# Patient Record
Sex: FEMALE | Race: WHITE | NOT HISPANIC OR LATINO
[De-identification: names, ages, dates, MRNs, and addresses within clinical notes are randomized per-mention and may not be internally consistent; named-entity substitution may affect disease eponyms.]

---

## 2020-12-11 PROBLEM — Z00.00 ENCOUNTER FOR PREVENTIVE HEALTH EXAMINATION: Status: ACTIVE | Noted: 2020-12-11

## 2020-12-23 PROBLEM — Z87.39 HISTORY OF SCOLIOSIS: Status: RESOLVED | Noted: 2020-12-23 | Resolved: 2020-12-23

## 2020-12-23 PROBLEM — Z86.79 HISTORY OF HYPERTENSION: Status: RESOLVED | Noted: 2020-12-23 | Resolved: 2020-12-23

## 2020-12-23 PROBLEM — Z87.39 HISTORY OF SPINAL STENOSIS: Status: RESOLVED | Noted: 2020-12-23 | Resolved: 2020-12-23

## 2020-12-23 PROBLEM — Z86.39 HISTORY OF HYPERCHOLESTEROLEMIA: Status: RESOLVED | Noted: 2020-12-23 | Resolved: 2020-12-23

## 2020-12-23 PROBLEM — Z87.39 HISTORY OF OSTEOPOROSIS: Status: RESOLVED | Noted: 2020-12-23 | Resolved: 2020-12-23

## 2020-12-23 PROBLEM — Z87.2 HISTORY OF ATOPIC DERMATITIS: Status: RESOLVED | Noted: 2020-12-23 | Resolved: 2020-12-23

## 2020-12-23 RX ORDER — LOSARTAN POTASSIUM 100 MG/1
TABLET, FILM COATED ORAL
Refills: 0 | Status: ACTIVE | COMMUNITY

## 2020-12-23 RX ORDER — ATORVASTATIN CALCIUM 80 MG/1
TABLET, FILM COATED ORAL
Refills: 0 | Status: ACTIVE | COMMUNITY

## 2020-12-23 RX ORDER — AMLODIPINE BESYLATE 5 MG/1
TABLET ORAL
Refills: 0 | Status: ACTIVE | COMMUNITY

## 2020-12-23 RX ORDER — METOPROLOL SUCCINATE 200 MG/1
TABLET, EXTENDED RELEASE ORAL
Refills: 0 | Status: ACTIVE | COMMUNITY

## 2020-12-23 RX ORDER — DUPILUMAB 200 MG/1.14ML
INJECTION, SOLUTION SUBCUTANEOUS
Refills: 0 | Status: ACTIVE | COMMUNITY

## 2020-12-28 ENCOUNTER — APPOINTMENT (OUTPATIENT)
Dept: RADIATION ONCOLOGY | Facility: CLINIC | Age: 80
End: 2020-12-28
Payer: MEDICARE

## 2020-12-28 DIAGNOSIS — Z87.39 PERSONAL HISTORY OF OTHER DISEASES OF THE MUSCULOSKELETAL SYSTEM AND CONNECTIVE TISSUE: ICD-10-CM

## 2020-12-28 DIAGNOSIS — Z86.39 PERSONAL HISTORY OF OTHER ENDOCRINE, NUTRITIONAL AND METABOLIC DISEASE: ICD-10-CM

## 2020-12-28 DIAGNOSIS — Z87.2 PERSONAL HISTORY OF DISEASES OF THE SKIN AND SUBCUTANEOUS TISSUE: ICD-10-CM

## 2020-12-28 DIAGNOSIS — Z86.79 PERSONAL HISTORY OF OTHER DISEASES OF THE CIRCULATORY SYSTEM: ICD-10-CM

## 2020-12-28 PROCEDURE — 99204 OFFICE O/P NEW MOD 45 MIN: CPT | Mod: 95

## 2020-12-29 NOTE — DISEASE MANAGEMENT
[Pathological] : TNM Stage: p [N/A] : Currently not applicable [FreeTextEntry4] : Bilateral early stage breast cancer [TTNM] : X [NTNM] : X [MTNM] : X

## 2020-12-29 NOTE — HISTORY OF PRESENT ILLNESS
[Home] : at home, [unfilled] , at the time of the visit. [Medical Office: ([facility])___] : at the medical office located in  [Spouse] : spouse [FreeTextEntry1] : Mrs. Patricia is a 80 year old female referred for breast cancer by Dr. Nath. She presents with newly diagnosed Stage I [id number] ER+/ NH- invasive ductal carcinoma of the right breast, and Stage I [id number] ER/NH+ invasive ductal carcinoma of the left breast.\par \par On 9/28/2020 Mrs. Patricia underwent a screening mammogram at [facility] which identified an irregular density upper outer right breast. Further evaluation was recommended.\par \par On 10/1/2020 she had an ultrasound at [facility] which revealed  suspicious hypoechoic shadowing structure measuring 5 mm in the right breast and a biopsy was recommended. \par \par On 10/23/2020 Mrs. Patricia underwent a right breast  ultrasound guided biopsy at [facility] which revealed invasive ductal carcinoma, grade 6/9, ER+, moderate to strong staining greater than 90%, NH- and HER2 was 2+ by immunohistochemistry and non amplified by FISH. Slides were confirmed at [facility]. The patient then met with Dr. Nath who ordered a breast MRI.\par \par On 11/2/2020 she had an MRI of bilateral breasts at [facility] which demonstrated the biopsy proven irregular enhancing mass in the right breast and a 5 mm indeterminate nodular focus of enhancement in the contralateral in the left breast.\par \par On 11/6/2020 Mrs. Cisneros underwent  a left breast MRI guided biopsy in the 5:00 area at [facility]. The pathology revealed  invasive ductal carcinoma with mucinous features, grade 4/9, measuring 2 mm in greatest dimension on a single core and associated with DCIS, ER+, NH+, HER2-0. The slides were reviewed at [facility] confirming the diagnosis of low grade invasive duct carcinoma, HER2 -. \par \par On 11/25/2020 she underwent a bilateral lumpectomy with bilateral sentinel node biopsy done by Dr. Nath. The pathology revealed that the right breast contained a 1.2 cm invasive duct carcinoma, grade 6/9, unifocal, no DCIS was present. One sentinel node was negative, repeat NH was negative, and repeat HER2 was negative, 1+ by IHC  staging T1cN0 Stage IA. The right sided tumor was sent for Oncotype, which was 18, with the distant recurrence risk at 9 years of 5%.\par The pathology in the left breast contained no residual invasive disease, but did contain 5 mm of DCIS. Margins were negative, no LVI was demonstrated of the right sided tumor. The left breast had 2 negative sentinel nodes. T1aN0-Stage IA- ER/NH +, HER2 -.\par \par Mrs. Patricia was evaluated by  Dr. Barber on 12/18/2020. Dr. Barber ordered a bone density test, which she is going for on 12/31/2020. Dr. Barber referred her back to Dr. Valdes, her endocrinologist, to help manage her osteoporosis, along with the bone density test results, to establish a plan for post radiation. She will be seeing Dr. Valdes in February. She will see Dr. Barber after radiation is complete. She is also scheduled to see Dr. Nath in March for a follow up. \par \par Currently, Mrs. Patricia states she is healing well, but she stated she has bruising on the lower half of the right breast. She did complain of aching in bilateral breasts, but it is not a pain she is able to rate. She is not taking any medication for the discomfort. She does complain of insomnia and anxiety, both of which are not new to her. She denies any issues post surgery. She states she has full ROM in bilateral arms. She denies any edema in her bilateral upper extremities or her bilateral lower extremities. \par \par

## 2020-12-29 NOTE — LETTER CLOSING
[Consult Closing:] : Thank you for allowing me to participate in the care of this patient.  If you have any questions, please do not hesitate to contact me. [Sincerely yours,] : Sincerely yours, [FreeTextEntry3] : Terese Denise MD\par

## 2020-12-29 NOTE — REVIEW OF SYSTEMS
[Edema Limbs: Grade 0] : Edema Limbs: Grade 0  [Fatigue: Grade 1 - Fatigue relieved by rest] : Fatigue: Grade 1 - Fatigue relieved by rest [Breast Pain: Grade 1 - Mild pain] : Breast Pain: Grade 1 - Mild pain [Anxiety: Grade 1 - Mild symptoms; intervention not indicated] : Anxiety: Grade 1 - Mild symptoms; intervention not indicated [Depression: Grade 0] : Depression: Grade 0 [Insomnia: Grade 1 - Mild difficulty falling asleep, staying asleep or waking up early] : Insomnia: Grade 1 - Mild difficulty falling asleep, staying asleep or waking up early [FreeTextEntry7] : unratable discomfort

## 2020-12-29 NOTE — OB/GYN HISTORY
[Menopause Age: ____] : patient was [unfilled] years old at menopause [___] : Living: [unfilled] [Spotting Between  Menses] : no spotting between menses [History of Birth Control Pills] : Patient has no history of taking birth control pills [History of Hormone Replacement Therapy] : no history of hormone replacement therapy

## 2021-01-04 ENCOUNTER — APPOINTMENT (OUTPATIENT)
Dept: RADIATION ONCOLOGY | Facility: CLINIC | Age: 81
End: 2021-01-04
Payer: MEDICARE

## 2021-01-04 VITALS
SYSTOLIC BLOOD PRESSURE: 120 MMHG | WEIGHT: 122 LBS | TEMPERATURE: 98 F | OXYGEN SATURATION: 98 % | RESPIRATION RATE: 14 BRPM | BODY MASS INDEX: 22.45 KG/M2 | HEART RATE: 75 BPM | HEIGHT: 62 IN | DIASTOLIC BLOOD PRESSURE: 69 MMHG

## 2021-01-04 PROCEDURE — 99214 OFFICE O/P EST MOD 30 MIN: CPT | Mod: 25

## 2021-01-05 NOTE — HISTORY OF PRESENT ILLNESS
[FreeTextEntry1] : Mrs. Patricia is a 80 year old female referred for breast cancer by Dr. Naht. She presents with newly diagnosed Stage I [id number] ER+/ MT- invasive ductal carcinoma of the right breast, and Stage I [id number] ER/MT+ invasive ductal carcinoma of the left breast.\par \par On 11/25/2020 she underwent a bilateral lumpectomy with bilateral sentinel node biopsy done by Dr. Nath. The pathology revealed that the right breast contained a 1.2 cm invasive duct carcinoma, grade 6/9, unifocal, no DCIS was present. One sentinel node was negative, repeat MT was negative, and repeat HER2 was negative, 1+ by IHC staging T1cN0 Stage IA. The right sided tumor was sent for Oncotype, which was 18, with the distant recurrence risk at 9 years of 5%.\par The pathology in the left breast contained no residual invasive disease, but did contain 5 mm of DCIS. Margins were negative, no LVI was demonstrated of the right sided tumor. The left breast had 2 negative sentinel nodes. T1aN0-Stage IA- ER/MT +, HER2 -.\par \par It is my opinion that the patient may benefit from adjuvant radiation to the left and right breast in an effort to improve her local control and decrease her risk of recurrence.  She and her  present today for exam, consent and simulation.

## 2021-01-05 NOTE — PHYSICAL EXAM
[Normal] : no focal deficits [de-identified] : s/p bilateral partial mastectomy, both breast are well healed.

## 2021-01-18 ENCOUNTER — TRANSCRIPTION ENCOUNTER (OUTPATIENT)
Age: 81
End: 2021-01-18

## 2021-01-20 ENCOUNTER — NON-APPOINTMENT (OUTPATIENT)
Age: 81
End: 2021-01-20

## 2021-01-21 VITALS
HEIGHT: 62 IN | OXYGEN SATURATION: 98 % | HEART RATE: 78 BPM | SYSTOLIC BLOOD PRESSURE: 122 MMHG | TEMPERATURE: 98 F | DIASTOLIC BLOOD PRESSURE: 69 MMHG | RESPIRATION RATE: 16 BRPM

## 2021-01-21 NOTE — REVIEW OF SYSTEMS
[Fatigue: Grade 0] : Fatigue: Grade 0 [Breast Pain: Grade 0] : Breast Pain: Grade 0 [Pruritus: Grade 0] : Pruritus: Grade 0 [Skin Hyperpigmentation: Grade 0] : Skin Hyperpigmentation: Grade 0 [Dermatitis Radiation: Grade 0] : Dermatitis Radiation: Grade 0

## 2021-01-21 NOTE — DISEASE MANAGEMENT
[Pathological] : TNM Stage: p [FreeTextEntry4] : Bilateral early stage breast cancer [TTNM] : X [NTNM] : X [MTNM] : X [N/A] : Currently not applicable [de-identified] : completed 4/16 fractions to the Left breast at a dose of 1060 cGy and 4/16 fractions to the right breast to a dose of 1049.5 cGy

## 2021-01-21 NOTE — HISTORY OF PRESENT ILLNESS
[FreeTextEntry1] : Mrs. Patricia is a 80 year old female referred for breast cancer by Dr. Nath. She presents with newly diagnosed Stage I [id number] ER+/ PA- invasive ductal carcinoma of the right breast, and Stage I [id number] ER/PA+ invasive ductal carcinoma of the left breast.\par \par She presents today for OTV completed 4/16 fractions to the left breast to a dose of 1060 cGy and to the right breast to a dose of 1049.5 cGy. She is feeling well today and with no complaints.

## 2021-01-27 ENCOUNTER — NON-APPOINTMENT (OUTPATIENT)
Age: 81
End: 2021-01-27

## 2021-01-27 VITALS
OXYGEN SATURATION: 96 % | SYSTOLIC BLOOD PRESSURE: 120 MMHG | DIASTOLIC BLOOD PRESSURE: 76 MMHG | TEMPERATURE: 98 F | HEIGHT: 62 IN | HEART RATE: 74 BPM | RESPIRATION RATE: 16 BRPM

## 2021-01-28 NOTE — HISTORY OF PRESENT ILLNESS
[FreeTextEntry1] : Mrs. Patrciia is a 80 year old female referred for breast cancer by Dr. Nath. She presents with newly diagnosed Stage I [id number] ER+/ CT- invasive ductal carcinoma of the right breast, and Stage I [id number] ER/CT+ invasive ductal carcinoma of the left breast.\par \par She presents today for OTV completed 9/16 fractions to the left breast to a dose of  2385 cGy and to the right breast to a dose of 2361.4 cGy. She is feeling well today. \par  She is using calendula cream BID The skin is looking slightly pink this week. She is experiencing fatigue and insomnia.

## 2021-01-28 NOTE — DISEASE MANAGEMENT
[Pathological] : TNM Stage: p [N/A] : Currently not applicable [FreeTextEntry4] : Bilateral early stage breast cancer [TTNM] : X [NTNM] : X [MTNM] : X [de-identified] : completed 9/16 fractions to the Left breast at a dose of 2385 cGy and 9/16 fractions to the right breast to a dose of 2361.4 cGy

## 2021-01-28 NOTE — PHYSICAL EXAM
[Normal] : normal scars, no masses, no nipple discharge [de-identified] : mild bilateral erythema

## 2021-02-03 ENCOUNTER — NON-APPOINTMENT (OUTPATIENT)
Age: 81
End: 2021-02-03

## 2021-02-03 VITALS
OXYGEN SATURATION: 98 % | TEMPERATURE: 98 F | DIASTOLIC BLOOD PRESSURE: 78 MMHG | RESPIRATION RATE: 16 BRPM | HEART RATE: 76 BPM | HEIGHT: 62 IN | SYSTOLIC BLOOD PRESSURE: 124 MMHG

## 2021-02-03 NOTE — REVIEW OF SYSTEMS
[Fatigue: Grade 0] : Fatigue: Grade 0 [Breast Pain: Grade 0] : Breast Pain: Grade 0 [Pruritus: Grade 1 - Mild or localized; topical intervention indicated] : Pruritus: Grade 1 - Mild or localized; topical intervention indicated [Skin Hyperpigmentation: Grade 0] : Skin Hyperpigmentation: Grade 0 [Dermatitis Radiation: Grade 1 - Faint erythema or dry desquamation] : Dermatitis Radiation: Grade 1 - Faint erythema or dry desquamation

## 2021-02-05 NOTE — DISEASE MANAGEMENT
[Pathological] : TNM Stage: p [N/A] : Currently not applicable [FreeTextEntry4] : Bilateral early stage breast cancer [TTNM] : X [NTNM] : X [MTNM] : X [de-identified] : completed 12/16 fractions to the Left breast at a dose of 3180 cGy and 12/16 fractions to the right breast to a dose of 3148.5 cGy

## 2021-02-05 NOTE — PHYSICAL EXAM
[Normal] : well developed, well nourished, in no acute distress [de-identified] : mild to moderate erythema bilaterally, maculopapular rash along the sternum. Small area of moist desquamation in the right inframammary fold

## 2021-02-05 NOTE — HISTORY OF PRESENT ILLNESS
[FreeTextEntry1] : Mrs. Patricia is a 80 year old female referred for breast cancer by Dr. Nath. She presents with newly diagnosed Stage I [id number] ER+/ WA- invasive ductal carcinoma of the right breast, and Stage I [id number] ER/WA+ invasive ductal carcinoma of the left breast.\par \par She presents today for OTV completed 12/16 fractions to the left breast to a dose of  3180 cGy and to the right breast to a dose of 3148.5 cGy. She is feeling well today. \par  She is using calendula cream BID The skin is looking a bit more pink and with a rash on the left chest wall and a bit on the right. She states that it is very itchy. She used Eurcissa on the area. She did use some hydrocortisone cream with the calendula. I also recommended Benadryl PO at night to help her sleep.

## 2021-02-10 ENCOUNTER — NON-APPOINTMENT (OUTPATIENT)
Age: 81
End: 2021-02-10

## 2021-02-11 VITALS
OXYGEN SATURATION: 99 % | HEIGHT: 62 IN | DIASTOLIC BLOOD PRESSURE: 80 MMHG | SYSTOLIC BLOOD PRESSURE: 128 MMHG | TEMPERATURE: 98 F | RESPIRATION RATE: 16 BRPM | HEART RATE: 80 BPM

## 2021-02-11 NOTE — PHYSICAL EXAM
[Normal] : normal scars, no masses, no nipple discharge [de-identified] : moderate erythema bilaterally, maculopapular rash along the sternum

## 2021-02-11 NOTE — REVIEW OF SYSTEMS
[Fatigue: Grade 0] : Fatigue: Grade 0 [Breast Pain: Grade 0] : Breast Pain: Grade 0 [Pruritus: Grade 1 - Mild or localized; topical intervention indicated] : Pruritus: Grade 1 - Mild or localized; topical intervention indicated [Dermatitis Radiation: Grade 1 - Faint erythema or dry desquamation] : Dermatitis Radiation: Grade 1 - Faint erythema or dry desquamation [Skin Hyperpigmentation: Grade 1 - Hyperpigmentation covering <10% BSA; no psychosocial impact] : Skin Hyperpigmentation: Grade 1 - Hyperpigmentation covering <10% BSA; no psychosocial impact

## 2021-02-11 NOTE — HISTORY OF PRESENT ILLNESS
[FreeTextEntry1] : Mrs. Patricia is a 80 year old female referred for breast cancer by Dr. Nath. She presents with newly diagnosed Stage I [id number] ER+/ MD- invasive ductal carcinoma of the right breast, and Stage I [id number] ER/MD+ invasive ductal carcinoma of the left breast.\par \par She presents today for OTV completed 16/16 fractions to the left breast to a dose of 4240 cGy and to the right breast to a dose of 4198  cGy. 1/4 fractions boost She is feeling well today. \par  She is using calendula cream BID She has developed a rash on the chest wall and it is itchy. She is using hydrocortisone cream and calendula. I have added aquaphor the chest wall and under her breast.

## 2021-02-11 NOTE — DISEASE MANAGEMENT
[Pathological] : TNM Stage: p [N/A] : Currently not applicable [FreeTextEntry4] : Bilateral early stage breast cancer [TTNM] : X [NTNM] : X [MTNM] : X [de-identified] : completed 16/16 fractions to the Left breast at a dose of 4240 cGy and 16/16 fractions to the right breast to a dose of 4198 cGy and 1/4 boost

## 2021-03-03 ENCOUNTER — APPOINTMENT (OUTPATIENT)
Dept: RADIATION ONCOLOGY | Facility: CLINIC | Age: 81
End: 2021-03-03
Payer: MEDICARE

## 2021-03-03 VITALS
HEART RATE: 76 BPM | RESPIRATION RATE: 16 BRPM | TEMPERATURE: 98 F | DIASTOLIC BLOOD PRESSURE: 80 MMHG | SYSTOLIC BLOOD PRESSURE: 126 MMHG | OXYGEN SATURATION: 98 %

## 2021-03-03 PROCEDURE — 99024 POSTOP FOLLOW-UP VISIT: CPT

## 2021-03-03 NOTE — REVIEW OF SYSTEMS
[Breast Pain: Grade 1 - Mild pain] : Breast Pain: Grade 1 - Mild pain [Pruritus: Grade 0] : Pruritus: Grade 0 [Skin Hyperpigmentation: Grade 1 - Hyperpigmentation covering <10% BSA; no psychosocial impact] : Skin Hyperpigmentation: Grade 1 - Hyperpigmentation covering <10% BSA; no psychosocial impact [Dermatitis Radiation: Grade 2 - Moderate to brisk erythema; patchy moist desquamation, mostly confined to skin folds and creases; moderate edema] : Dermatitis Radiation: Grade 2 - Moderate to brisk erythema; patchy moist desquamation, mostly confined to skin folds and creases; moderate edema

## 2021-03-03 NOTE — VITALS
[Maximal Pain Intensity: 5/10] : 5/10 [OTC] : OTC [80: Normal activity with effort; some signs or symptoms of disease.] : 80: Normal activity with effort; some signs or symptoms of disease.  [ECOG Performance Status: 1 - Restricted in physically strenuous activity but ambulatory and able to carry out work of a light or sedentary nature] : Performance Status: 1 - Restricted in physically strenuous activity but ambulatory and able to carry out work of a light or sedentary nature, e.g., light house work, office work

## 2021-03-06 NOTE — HISTORY OF PRESENT ILLNESS
[FreeTextEntry1] : Mrs. Patricia is a 80 year old female referred for breast cancer by Dr. Nath. She presents with newly diagnosed Stage I [id number] ER+/ NJ- invasive ductal carcinoma of the right breast, and Stage I O4mH4Y1 ER/NJ+ invasive ductal carcinoma of the left breast.\par She presents today for a follow up. She completed 16/16 fractions to the left breast to a dose of 4240 cGy and to the right breast to a dose of 4198  cGy. 4/4 fractions boost to a dose of 1000 cGy on 2/16/2021.  \par She had been using calendula cream BID with hydrocortisone cream for the itching. She started with Aquaphor on the chest wall and under her breast. She is now experiencing some mild dry desquamation under the left arm and some mild wet desquamation under the left breast. It is extremely irritated and painful, 5/10 on the pain scale and is taking Tylenol. She states the breast feels heavy and swollen. She was given Xerofoam dressing to put under her left breast and at the left axilla. She will follow up in 2 weeks but will call us if there is no improvement.

## 2021-03-06 NOTE — DISEASE MANAGEMENT
Interval Hx: swelling of her arm has improved, patient had a BM overnight.   Review of Systems   Constitutional: Negative for chills, diaphoresis and fever.   Eyes: Negative for visual disturbance.   Respiratory: Negative for cough and shortness of breath.    Cardiovascular: Negative for chest pain and leg swelling.   Gastrointestinal: Negative for abdominal pain, nausea and vomiting.   Musculoskeletal: Positive for arthralgias and back pain.   Skin: Positive for wound. Negative for color change.     Objective:     Vital Signs (Most Recent):  Temp: 98.5 °F (36.9 °C) (04/20/17 1136)  Pulse: 69 (04/20/17 1136)  Resp: 16 (04/20/17 1136)  BP: (!) 82/47 (04/20/17 1136)  SpO2: 95 % (04/20/17 1136) Vital Signs (24h Range):  Temp:  [97.7 °F (36.5 °C)-98.7 °F (37.1 °C)] 98.5 °F (36.9 °C)  Pulse:  [69-86] 69  Resp:  [16-19] 16  SpO2:  [94 %-97 %] 95 %  BP: ()/(47-63) 82/47     Weight: 57.9 kg (127 lb 10.3 oz)  Body mass index is 27.62 kg/(m^2).    Intake/Output Summary (Last 24 hours) at 04/20/17 1415  Last data filed at 04/20/17 0920   Gross per 24 hour   Intake           1507.2 ml   Output                0 ml   Net           1507.2 ml      Physical Exam   Constitutional: She appears well-developed. No distress.   Cardiovascular: Normal rate, regular rhythm and normal heart sounds.    Pulmonary/Chest: Effort normal and breath sounds normal. No respiratory distress.   Abdominal: Soft. Bowel sounds are normal. She exhibits no distension. There is no tenderness.   Musculoskeletal: She exhibits edema (right arm swelling. tender and painful).   Neurological: She is alert.   Skin: Skin is warm and dry. She is not diaphoretic. There is erythema.   Vitals reviewed.        [Pathological] : TNM Stage: p [N/A] : Currently not applicable [FreeTextEntry4] : Bilateral early stage breast cancer [TTNM] : X [NTNM] : X [MTNM] : X [de-identified] : completed 16/16 fractions to the Left breast at a dose of 4240 cGy and 16/16 fractions to the right breast to a dose of 4198 cGy and 1/4 boost

## 2021-03-06 NOTE — PHYSICAL EXAM
[Normal] : well developed, well nourished, in no acute distress [de-identified] : right breast has moderate erythema but no desquamation, left breast moderat to severe erythema with a small area of moist desquamtion in the inframmary fold and the left axilla

## 2021-03-17 ENCOUNTER — APPOINTMENT (OUTPATIENT)
Dept: RADIATION ONCOLOGY | Facility: CLINIC | Age: 81
End: 2021-03-17
Payer: MEDICARE

## 2021-03-17 VITALS
OXYGEN SATURATION: 99 % | SYSTOLIC BLOOD PRESSURE: 128 MMHG | WEIGHT: 121 LBS | BODY MASS INDEX: 22.26 KG/M2 | HEART RATE: 76 BPM | RESPIRATION RATE: 16 BRPM | TEMPERATURE: 98 F | HEIGHT: 62 IN | DIASTOLIC BLOOD PRESSURE: 82 MMHG

## 2021-03-17 PROCEDURE — 99024 POSTOP FOLLOW-UP VISIT: CPT

## 2021-03-17 RX ORDER — ANASTROZOLE TABLETS 1 MG/1
1 TABLET ORAL
Refills: 0 | Status: ACTIVE | COMMUNITY

## 2021-03-18 NOTE — REVIEW OF SYSTEMS
[Fatigue: Grade 0] : Fatigue: Grade 0 [Breast Pain: Grade 1 - Mild pain] : Breast Pain: Grade 1 - Mild pain [Pruritus: Grade 0] : Pruritus: Grade 0 [Skin Hyperpigmentation: Grade 1 - Hyperpigmentation covering <10% BSA; no psychosocial impact] : Skin Hyperpigmentation: Grade 1 - Hyperpigmentation covering <10% BSA; no psychosocial impact [Dermatitis Radiation: Grade 0] : Dermatitis Radiation: Grade 0 [FreeTextEntry7] : sensitive left breast

## 2021-03-18 NOTE — HISTORY OF PRESENT ILLNESS
[FreeTextEntry1] : Mrs. Patricia is a 80 year old female referred for breast cancer by Dr. Nath. She presents with newly diagnosed Stage I [id number] ER+/ AK- invasive ductal carcinoma of the right breast, and Stage I [id number] ER/AK+ invasive ductal carcinoma of the left breast.\par \par On 9/28/2020 Mrs. Patricia underwent a screening mammogram at [facility] which identified an irregular density upper outer right breast. Further evaluation was recommended.\par \par On 10/1/2020 she had an ultrasound at [facility] which revealed suspicious hypoechoic shadowing structure measuring 5 mm in the right breast and a biopsy was recommended. \par \par On 10/23/2020 Mrs. Patricia underwent a right breast ultrasound guided biopsy at [facility] which revealed invasive ductal carcinoma, grade 6/9, ER+, moderate to strong staining greater than 90%, AK- and HER2 was 2+ by immunohistochemistry and non amplified by FISH. Slides were confirmed at [facility]. The patient then met with Dr. Nath who ordered a breast MRI.\par \par On 11/2/2020 she had an MRI of bilateral breasts at [facility] which demonstrated the biopsy proven irregular enhancing mass in the right breast and a 5 mm indeterminate nodular focus of enhancement in the contralateral in the left breast.\par \par On 11/6/2020 Mrs. Cisneros underwent a left breast MRI guided biopsy in the 5:00 area at [facility]. The pathology revealed invasive ductal carcinoma with mucinous features, grade 4/9, measuring 2 mm in greatest dimension on a single core and associated with DCIS, ER+, AK+, HER2-0. The slides were reviewed at [facility] confirming the diagnosis of low grade invasive duct carcinoma, HER2 -. \par \par On 11/25/2020 she underwent a bilateral lumpectomy with bilateral sentinel node biopsy done by Dr. Nath. The pathology revealed that the right breast contained a 1.2 cm invasive duct carcinoma, grade 6/9, unifocal, no DCIS was present. One sentinel node was negative, repeat AK was negative, and repeat HER2 was negative, 1+ by IHC staging T1cN0 Stage IA. The right sided tumor was sent for Oncotype, which was 18, with the distant recurrence risk at 9 years of 5%.\par The pathology in the left breast contained no residual invasive disease, but did contain 5 mm of DCIS. Margins were negative, no LVI was demonstrated of the right sided tumor. The left breast had 2 negative sentinel nodes. T1aN0-Stage IA- ER/AK +, HER2 -.\par \par 3/17/2021\par Mrs. Patricia presents today for her PTE. She completed 16/16 fractions to the Left breast at a dose of 4240 cGy and 16/16 fractions to the right breast to a dose of 4198 cGy and 4/4 boost to a dose of 1000 cGy on 2/16/2021. \par She has been using Calendula, Aquaphor and Xerofoam under the left breast. \par She saw Dr. Nath on 3/8/2021 and is healed from surgery. She is scheduled for her annual screening mammogram and US in 9/2021. \par On 2/24/2021 She saw Dr. Barber, she began her Anastrazole. She has resumed her Prolia every 6 months, for her osteoporosis, under the care of Dr. Valdes.\par Mrs. Patricia states she is feeling well. She states her left breast is more sore and sensitive then the right breast. Her skin is healed . She received both her covid vaccines.

## 2021-03-18 NOTE — PHYSICAL EXAM
[Normal] : no palpable adenopathy [de-identified] : mild persistent erythema on the left breast, skin reaction of the right breast completely resolved.

## 2021-03-18 NOTE — DISEASE MANAGEMENT
[Pathological] : TNM Stage: p [N/A] : Currently not applicable [FreeTextEntry4] : Bilateral early stage breast cancer [TTNM] : X [NTNM] : X [MTNM] : X [de-identified] : completed 16/16 fractions to the Left breast at a dose of 4240 cGy and 16/16 fractions to the right breast to a dose of 4198 cGy and 4/4 boost to a dose of 1000 cGy on 2/16/2021.

## 2021-09-08 ENCOUNTER — APPOINTMENT (OUTPATIENT)
Dept: RADIATION ONCOLOGY | Facility: CLINIC | Age: 81
End: 2021-09-08
Payer: MEDICARE

## 2021-09-08 VITALS
SYSTOLIC BLOOD PRESSURE: 120 MMHG | WEIGHT: 122 LBS | RESPIRATION RATE: 16 BRPM | TEMPERATURE: 98.5 F | DIASTOLIC BLOOD PRESSURE: 68 MMHG | BODY MASS INDEX: 22.45 KG/M2 | OXYGEN SATURATION: 96 % | HEIGHT: 62 IN | HEART RATE: 76 BPM

## 2021-09-08 PROCEDURE — 99214 OFFICE O/P EST MOD 30 MIN: CPT

## 2021-09-08 NOTE — VITALS
[Pain Scale Level Reviewed] : 1/10 [90: Able to carry normal activity; minor signs or symptoms of disease.] : 90: Able to carry normal activity; minor signs or symptoms of disease.

## 2021-09-09 NOTE — DISEASE MANAGEMENT
[Pathological] : TNM Stage: p [N/A] : Currently not applicable [FreeTextEntry4] : Bilateral early stage breast cancer [TTNM] : X [NTNM] : X [MTNM] : X [de-identified] : completed 16/16 fractions to the Left breast at a dose of 4240 cGy and 16/16 fractions to the right breast to a dose of 4198 cGy and 4/4 boost to a dose of 1000 cGy on 2/16/2021.

## 2021-09-09 NOTE — HISTORY OF PRESENT ILLNESS
[FreeTextEntry1] : Mrs. Patricia is a 80 year old female referred for breast cancer by Dr. Nath. She presents with newly diagnosed Stage I [id number] ER+/ MN- invasive ductal carcinoma of the right breast, and Stage I [id number] ER/MN+ invasive ductal carcinoma of the left breast.\par \par On 9/28/2020 Mrs. Patricia underwent a screening mammogram at [facility] which identified an irregular density upper outer right breast. Further evaluation was recommended.\par \par On 10/1/2020 she had an ultrasound at [facility] which revealed suspicious hypoechoic shadowing structure measuring 5 mm in the right breast and a biopsy was recommended. \par \par On 10/23/2020 Mrs. Patricia underwent a right breast ultrasound guided biopsy at [facility] which revealed invasive ductal carcinoma, grade 6/9, ER+, moderate to strong staining greater than 90%, MN- and HER2 was 2+ by immunohistochemistry and non amplified by FISH. Slides were confirmed at [facility].\par \par On 11/2/2020 she had an MRI of bilateral breasts at [facility] which demonstrated the biopsy proven irregular enhancing mass in the right breast and a 5 mm indeterminate nodular focus of enhancement in the contralateral in the left breast.\par \par On 11/6/2020 Mrs. Cisneros underwent a left breast MRI guided biopsy in the 5:00 area at [facility]. The pathology revealed invasive ductal carcinoma with mucinous features, grade 4/9, measuring 2 mm in greatest dimension on a single core and associated with DCIS, ER+, MN+, HER2-0. The slides were reviewed at [facility] confirming the diagnosis of low grade invasive duct carcinoma, HER2 -. \par \par On 11/25/2020 she underwent a bilateral lumpectomy with bilateral sentinel node biopsy done by Dr. Nath. The pathology revealed that the right breast contained a 1.2 cm invasive duct carcinoma, grade 6/9, unifocal, no DCIS was present. One sentinel node was negative, repeat MN was negative, and repeat HER2 was negative, 1+ by IHC staging T1cN0 Stage IA. The right sided tumor was sent for Oncotype, which was 18, with the distant recurrence risk at 9 years of 5%.\par The pathology in the left breast contained no residual invasive disease, but did contain 5 mm of DCIS. Margins were negative, no LVI was demonstrated of the right sided tumor. The left breast had 2 negative sentinel nodes. T1aN0-Stage IA- ER/MN +, HER2 -.\par \par  She completed 16 of 16 fractions to the left breast at a dose of 4240 cGy, and 16 of 16 fractions to the right breast to a dose of 4240cGy,  and 4 of 4 boost to a dose of 1000 cGy on 2/16/2021. \par On 2/24/2021 she saw Dr. Barber, she began her Anastrazole. She has resumed her Prolia every 6 months, for her osteoporosis, under the care of Dr. Valdes. She has been following up with Dr. Nath. 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